# Patient Record
Sex: FEMALE | Race: WHITE
[De-identification: names, ages, dates, MRNs, and addresses within clinical notes are randomized per-mention and may not be internally consistent; named-entity substitution may affect disease eponyms.]

---

## 2022-07-08 ENCOUNTER — HOSPITAL ENCOUNTER (EMERGENCY)
Dept: HOSPITAL 76 - ED | Age: 22
Discharge: HOME | End: 2022-07-08
Payer: COMMERCIAL

## 2022-07-08 VITALS — DIASTOLIC BLOOD PRESSURE: 80 MMHG | SYSTOLIC BLOOD PRESSURE: 123 MMHG

## 2022-07-08 DIAGNOSIS — G43.901: Primary | ICD-10-CM

## 2022-07-08 PROCEDURE — 96372 THER/PROPH/DIAG INJ SC/IM: CPT

## 2022-07-08 PROCEDURE — 99282 EMERGENCY DEPT VISIT SF MDM: CPT

## 2022-07-08 PROCEDURE — 99284 EMERGENCY DEPT VISIT MOD MDM: CPT

## 2022-07-08 NOTE — CT REPORT
PROCEDURE:  HEAD WO

 

INDICATIONS:  headache

 

TECHNIQUE:  

Noncontrast 4.5 mm thick angled axial sections acquired from the foramen magnum to the vertex.  For r
adiation dose reduction, the following was used:  automated exposure control, adjustment of mA and/or
 kV according to patient size.

 

COMPARISON:  None.

 

FINDINGS:  

Image quality:  Excellent.  

 

CSF spaces:  Basal cisterns are patent.  No extra-axial fluid collections.  Ventricles are normal in 
size and shape.  

 

Brain:  No midline shift.  No intracranial masses or hemorrhage.  Gray-white matter interface is norm
al. Large midline congenital lipoma. 

 

Skull and face:  Calvarium and visualized facial bones are intact, without suspicious lesions.  

 

Sinuses:  Visualized sinuses and mastoids are clear.  

 

IMPRESSION:  No acute intracranial disease process.

 

Reviewed by: Josee Foss MD, PhD on 7/8/2022 3:26 PM PDT

Approved by: Josee Foss MD, PhD on 7/8/2022 3:26 PM PDT

 

 

Station ID:  [id number]

## 2022-07-08 NOTE — ED PHYSICIAN DOCUMENTATION
PD HPI HEADACHE





- Stated complaint


Stated Complaint: DIZZINESS,L EAR PX





- Chief complaint


Chief Complaint: Neuro





- History obtained from


History obtained from: Patient





- Additional information


Additional information: 





22-year-old woman developed gradual onset headache last evening, bifrontal.  Its

associated with mild nausea and mild light sensitivity.  It was worse last night

but better now.  No neck stiffness, fevers, or URI symptoms.  No myalgias.  She 

has had headaches in the past but this is the worst.





Review of Systems


Ten Systems: 10 systems reviewed and negative


Constitutional: denies: Fever, Chills


Nose: denies: Rhinorrhea / runny nose


Throat: denies: Sore throat


Cardiac: denies: Chest pain / pressure, Palpitations


Respiratory: denies: Dyspnea, Cough





PD PAST MEDICAL HISTORY





- Present Medications


Home Medications: 


                                Ambulatory Orders











 Medication  Instructions  Recorded  Confirmed


 


Bcp 1 tab DAILY 07/08/22 


 


SUMAtriptan [Imitrex] 25 mg PO BID PRN #10 tablet 07/08/22 














- Allergies


Allergies/Adverse Reactions: 


                                    Allergies











Allergy/AdvReac Type Severity Reaction Status Date / Time


 


azithromycin [From Zithromax] Allergy  Hives Verified 07/08/22 13:25














PD ED PE NORMAL





- Vitals


Vital signs reviewed: Yes





- General


General: Alert and oriented X 3, Other (She appears quite comfortable and is 

moving freely.  There is no meningismus.)





- HEENT


HEENT: PERRL, EOMI





- Neck


Neck: Supple, no meningeal sign, No bony TTP





- Cardiac


Cardiac: RRR, No murmur





- Respiratory


Respiratory: No respiratory distress, Clear bilaterally





- Derm


Derm: No rash





- Neuro


Neuro: Alert and oriented X 3, CNs 2-12 intact, No motor deficit, No sensory 

deficit, Normal speech


Eye Opening: Spontaneous


Motor: Obeys Commands


Verbal: Oriented


GCS Score: 15





Results





- Vitals


Vitals: 


                               Vital Signs - 24 hr











  07/08/22





  13:25


 


Temperature 36.5 C


 


Heart Rate 100


 


Respiratory 16





Rate 


 


Blood Pressure 123/80


 


O2 Saturation 98








                                     Oxygen











O2 Source                      Room air

















PD MEDICAL DECISION MAKING





- ED course


ED course: 





22-year-old presents with gradual onset worst headache of life.  Normal exam and

well-appearing.  She thinks to migraine although has not had 1 before.  Because 

of that a CT was done without pertinent positive findings, it was normal.  She 

was feeling better after subcutaneous Imitrex consistent with her presumed 

diagnosis.





Departure





- Departure


Disposition: 01 Home, Self Care


Clinical Impression: 


Migraine


Qualifiers:


 Migraine type: unspecified Status migrainosus presence: with status migrainosus

Intractability: not intractable Qualified Code(s): G43.901 - Migraine, 

unspecified, not intractable, with status migrainosus





Condition: Good


Record reviewed to determine appropriate education?: Yes


Instructions:  ED Headache Migraine, Imitrex


Prescriptions: 


SUMAtriptan [Imitrex] 25 mg PO BID PRN #10 tablet


 PRN Reason: Headache


Comments: 


You are seen today for a headache, it likely is a migraine given that you had 

relief with Imitrex.  A CAT scan of your brain was done without any 

abnormalities.  [place].  Return for new or 

worsening symptoms.